# Patient Record
Sex: MALE | Race: WHITE | NOT HISPANIC OR LATINO | URBAN - METROPOLITAN AREA
[De-identification: names, ages, dates, MRNs, and addresses within clinical notes are randomized per-mention and may not be internally consistent; named-entity substitution may affect disease eponyms.]

---

## 2019-03-06 ENCOUNTER — APPOINTMENT (RX ONLY)
Dept: URBAN - METROPOLITAN AREA CLINIC 176 | Facility: CLINIC | Age: 24
Setting detail: DERMATOLOGY
End: 2019-03-06

## 2019-03-06 VITALS — HEIGHT: 67 IN | WEIGHT: 165 LBS

## 2019-03-06 DIAGNOSIS — L74.51 PRIMARY FOCAL HYPERHIDROSIS: ICD-10-CM

## 2019-03-06 PROBLEM — L74.519 PRIMARY FOCAL HYPERHIDROSIS, UNSPECIFIED: Status: ACTIVE | Noted: 2019-03-06

## 2019-03-06 PROBLEM — L70.0 ACNE VULGARIS: Status: ACTIVE | Noted: 2019-03-06

## 2019-03-06 PROBLEM — L74.513 PRIMARY FOCAL HYPERHIDROSIS, SOLES: Status: ACTIVE | Noted: 2019-03-06

## 2019-03-06 PROBLEM — L85.3 XEROSIS CUTIS: Status: ACTIVE | Noted: 2019-03-06

## 2019-03-06 PROCEDURE — ? COUNSELING

## 2019-03-06 PROCEDURE — ? PRESCRIPTION

## 2019-03-06 PROCEDURE — 99213 OFFICE O/P EST LOW 20 MIN: CPT

## 2019-03-06 RX ORDER — GLYCOPYRROLATE 2 MG/1
TABLET ORAL TID
Qty: 90 | Refills: 1 | Status: ERX | COMMUNITY
Start: 2019-03-06

## 2019-03-06 RX ADMIN — GLYCOPYRROLATE: 2 TABLET ORAL at 00:00

## 2019-03-06 ASSESSMENT — LOCATION SIMPLE DESCRIPTION DERM
LOCATION SIMPLE: RIGHT PLANTAR SURFACE
LOCATION SIMPLE: LEFT PLANTAR SURFACE
LOCATION SIMPLE: LEFT BREAST
LOCATION SIMPLE: ABDOMEN

## 2019-03-06 ASSESSMENT — LOCATION DETAILED DESCRIPTION DERM
LOCATION DETAILED: LEFT INFRAMAMMARY CREASE (OUTER QUADRANT)
LOCATION DETAILED: LEFT MEDIAL PLANTAR MIDFOOT
LOCATION DETAILED: RIGHT RIB CAGE
LOCATION DETAILED: RIGHT MEDIAL PLANTAR MIDFOOT

## 2019-03-06 ASSESSMENT — LOCATION ZONE DERM
LOCATION ZONE: FEET
LOCATION ZONE: TRUNK

## 2019-03-06 NOTE — PROCEDURE: COUNSELING
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
Patient Specific Counseling (Will Not Stick From Patient To Patient): Pt has been taking Robinul 4mg daily\\nHe denies any problems with vision changes, constipation, palpitations.  He does endorse dry mouth.\\nWe will try oxybutinin 5mg bid titrated up from 2.5mg bid if qbrexza is ineffective.  Pt will call for this.  He should not take any of these in combination—discussed.  Pt will use drysol bid to armpits and hands.  He will consider iontophoresis.\\n\\nHe will take up to 6 mg in 2 divided doses about 6 hrs apart during the important waking hrs.  We also discussed trying 4mg once and then taking another 1 mg 3 hrs later when the effects start to wane and then another 1mg 3 hrs after that.
Detail Level: Detailed
Medical Necessity Information: LCD Guidelines vary from payer to payer. Please check with your payer's policy to determine medical necessity.

## 2021-06-29 ENCOUNTER — APPOINTMENT (RX ONLY)
Dept: URBAN - METROPOLITAN AREA CLINIC 176 | Facility: CLINIC | Age: 26
Setting detail: DERMATOLOGY
End: 2021-06-29

## 2021-06-29 VITALS — WEIGHT: 180 LBS | HEIGHT: 68 IN

## 2021-06-29 DIAGNOSIS — L57.8 OTHER SKIN CHANGES DUE TO CHRONIC EXPOSURE TO NONIONIZING RADIATION: ICD-10-CM | Status: STABLE

## 2021-06-29 DIAGNOSIS — L74.51 PRIMARY FOCAL HYPERHIDROSIS: ICD-10-CM

## 2021-06-29 DIAGNOSIS — L91.0 HYPERTROPHIC SCAR: ICD-10-CM

## 2021-06-29 PROBLEM — L74.519 PRIMARY FOCAL HYPERHIDROSIS, UNSPECIFIED: Status: ACTIVE | Noted: 2021-06-29

## 2021-06-29 PROCEDURE — ? COUNSELING

## 2021-06-29 PROCEDURE — 11900 INJECT SKIN LESIONS </W 7: CPT | Mod: 59

## 2021-06-29 PROCEDURE — ? SHAVE REMOVAL

## 2021-06-29 PROCEDURE — 99213 OFFICE O/P EST LOW 20 MIN: CPT | Mod: 25

## 2021-06-29 PROCEDURE — ? INTRALESIONAL KENALOG

## 2021-06-29 PROCEDURE — 11310 SHAVE SKIN LESION 0.5 CM/<: CPT

## 2021-06-29 ASSESSMENT — LOCATION DETAILED DESCRIPTION DERM
LOCATION DETAILED: LEFT MEDIAL SUPERIOR CHEST
LOCATION DETAILED: LEFT POSTERIOR EAR

## 2021-06-29 ASSESSMENT — LOCATION ZONE DERM
LOCATION ZONE: EAR
LOCATION ZONE: TRUNK

## 2021-06-29 ASSESSMENT — LOCATION SIMPLE DESCRIPTION DERM
LOCATION SIMPLE: CHEST
LOCATION SIMPLE: LEFT EAR

## 2021-06-29 NOTE — PROCEDURE: COUNSELING
Detail Level: Generalized
Patient Specific Counseling (Will Not Stick From Patient To Patient): Pt is using hidrex with good results.  The time commitment is the biggest barrier for this for him.
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
Detail Level: Detailed
Medical Necessity Information: LCD Guidelines vary from payer to payer. Please check with your payer's policy to determine medical necessity.

## 2021-06-29 NOTE — PROCEDURE: INTRALESIONAL KENALOG
Detail Level: Detailed
Total Volume Injected (Ccs- Only Use Numbers And Decimals): 0.5
Lot # (Optional): [id number]
Concentration Of Solution Injected (Mg/Ml): 10.0
Administered By (Optional): William Guzman DO
Include Z78.9 (Other Specified Conditions Influencing Health Status) As An Associated Diagnosis?: No
X Size Of Lesion In Cm (Optional): 0
Expiration Date (Optional): 06/22
Medical Necessity Clause: This procedure was medically necessary because the lesions that were treated were:
Treatment Number (Optional): 1
Kenalog Preparation: Kenalog
Consent: The risks of atrophy were reviewed with the patient.
Validate Note Data When Using Inventory: Yes

## 2021-06-29 NOTE — PROCEDURE: SHAVE REMOVAL
Medical Necessity Information: It is in your best interest to select a reason for this procedure from the list below. All of these items fulfill various CMS LCD requirements except the new and changing color options.
Medical Necessity Clause: This procedure was medically necessary because the lesion that was treated was:
Lab: [id number]
Detail Level: Detailed
Was A Bandage Applied: Yes
Size Of Lesion In Cm (Required): 0.3
X Size Of Lesion In Cm (Optional): 0
Biopsy Method: Dermablade
Anesthesia Type: 1% lidocaine with epinephrine
Hemostasis: Electrocautery
Wound Care: Petrolatum
Path Notes (To The Dermatopathologist): Please check margins.
Render Path Notes In Note?: No
Consent was obtained from the patient. The risks and benefits to therapy were discussed in detail. Specifically, the risks of infection, scarring, bleeding, prolonged wound healing, incomplete removal, allergy to anesthesia, nerve injury and recurrence were addressed. Prior to the procedure, the treatment site was clearly identified and confirmed by the patient. All components of Universal Protocol/PAUSE Rule completed.
Post-Care Instructions: I reviewed with the patient in detail post-care instructions. Patient is to keep the biopsy site dry overnight, and then apply Vaseline twice daily until healed or Mupirocin as instructed.
Notification Instructions: Patient will be notified of biopsy results. However, patient instructed to call the office if not contacted within 2 weeks.
Billing Type: Third-Party Bill

## 2021-09-29 ENCOUNTER — APPOINTMENT (RX ONLY)
Dept: URBAN - METROPOLITAN AREA CLINIC 176 | Facility: CLINIC | Age: 26
Setting detail: DERMATOLOGY
End: 2021-09-29

## 2021-09-29 VITALS — WEIGHT: 180 LBS | HEIGHT: 68 IN

## 2021-09-29 DIAGNOSIS — L72.8 OTHER FOLLICULAR CYSTS OF THE SKIN AND SUBCUTANEOUS TISSUE: ICD-10-CM

## 2021-09-29 DIAGNOSIS — L91.0 HYPERTROPHIC SCAR: ICD-10-CM | Status: IMPROVED

## 2021-09-29 PROCEDURE — 99213 OFFICE O/P EST LOW 20 MIN: CPT

## 2021-09-29 PROCEDURE — ? TREATMENT REGIMEN

## 2021-09-29 PROCEDURE — ? COUNSELING

## 2021-09-29 ASSESSMENT — LOCATION SIMPLE DESCRIPTION DERM
LOCATION SIMPLE: RIGHT EAR
LOCATION SIMPLE: ABDOMEN
LOCATION SIMPLE: LEFT EAR

## 2021-09-29 ASSESSMENT — LOCATION ZONE DERM
LOCATION ZONE: TRUNK
LOCATION ZONE: EAR

## 2021-09-29 ASSESSMENT — LOCATION DETAILED DESCRIPTION DERM
LOCATION DETAILED: RIGHT ANTERIOR EARLOBE
LOCATION DETAILED: PERIUMBILICAL SKIN
LOCATION DETAILED: LEFT ANTERIOR EARLOBE

## 2021-09-29 NOTE — PROCEDURE: COUNSELING
Detail Level: Detailed
Patient Specific Counseling (Will Not Stick From Patient To Patient): I recommended to the pt that he not get tattoos or get earrings again.  If pt does wish to do this, I have offered to use ILK low dose the day after to try to prevent this.
Patient Specific Counseling (Will Not Stick From Patient To Patient): Rec Gen Surg excision